# Patient Record
Sex: MALE | Race: BLACK OR AFRICAN AMERICAN
[De-identification: names, ages, dates, MRNs, and addresses within clinical notes are randomized per-mention and may not be internally consistent; named-entity substitution may affect disease eponyms.]

---

## 2018-05-04 ENCOUNTER — HOSPITAL ENCOUNTER (OUTPATIENT)
Dept: HOSPITAL 72 - MRI | Age: 83
Discharge: HOME | End: 2018-05-04
Payer: MEDICARE

## 2018-05-04 DIAGNOSIS — E23.6: Primary | ICD-10-CM

## 2018-05-04 PROCEDURE — 70551 MRI BRAIN STEM W/O DYE: CPT

## 2018-05-07 NOTE — DIAGNOSTIC IMAGING REPORT
Indication: History of a sella mass. Follow-up.

 

Technique: MRI of the sella was performed at 1.5 Maggie magnet. Pulse sequences

obtained include pre-gadolinium sagittal and coronal T1 fast spin-echo, sagittal and

coronal T2 fast spin-echo with fat saturation, and post gadolinium sagittal and

coronal T1 fast spin-echo.

 

Comparison: MRI sella 9/12/2013

 

Findings:

 

Previous examination demonstrated the mass in the sella which is exhibited fluid

signal characteristics. Current examination shows a soft tissue mass within the

adenohypophysis that does not enhance relative to the surrounding pituitary gland.

The mass has approximate measurement of 7 mm and is round. The mass is slightly left

of midline. There is no suprasellar extension. There is no evidence of cavernous

sinus extension or involvement. The cavernous sinus enhances normally. The cavernous

part of the ICA shows a good flow-void bilaterally. There is considerable motion

which limits evaluation.

 

IMPRESSION:

 

Nonenhancing 7 mm mass within the pituitary gland demonstrated. Mass has not enlarged

since 9/12/2013, but the appearance has changed significantly with interval

resolution of the cystic aspect of this mass. Other considerations in the

differential diagnosis other than pituitary adenoma include prior pituitary

hemorrhage, resolution of the nonspecific cyst within the gland or possibly

degenerating Rathke's cleft cyst.

 

Limited evaluation due to motion